# Patient Record
Sex: FEMALE | Race: WHITE | NOT HISPANIC OR LATINO | Employment: OTHER | URBAN - METROPOLITAN AREA
[De-identification: names, ages, dates, MRNs, and addresses within clinical notes are randomized per-mention and may not be internally consistent; named-entity substitution may affect disease eponyms.]

---

## 2017-01-01 ENCOUNTER — ALLSCRIPTS OFFICE VISIT (OUTPATIENT)
Dept: OTHER | Facility: OTHER | Age: 77
End: 2017-01-01

## 2018-01-01 ENCOUNTER — CLINICAL SUPPORT (OUTPATIENT)
Dept: CARDIOLOGY CLINIC | Facility: CLINIC | Age: 78
End: 2018-01-01
Payer: COMMERCIAL

## 2018-01-01 ENCOUNTER — HOSPITAL ENCOUNTER (EMERGENCY)
Facility: HOSPITAL | Age: 78
End: 2018-05-04
Attending: EMERGENCY MEDICINE
Payer: COMMERCIAL

## 2018-01-01 VITALS — HEART RATE: 52 BPM | RESPIRATION RATE: 16 BRPM

## 2018-01-01 DIAGNOSIS — I48.0 PAROXYSMAL ATRIAL FIBRILLATION (HCC): ICD-10-CM

## 2018-01-01 DIAGNOSIS — I49.9 ARRHYTHMIA: ICD-10-CM

## 2018-01-01 DIAGNOSIS — Z95.0 CARDIAC PACEMAKER IN SITU: ICD-10-CM

## 2018-01-01 DIAGNOSIS — I46.9 CARDIAC ARREST (HCC): Primary | ICD-10-CM

## 2018-01-01 DIAGNOSIS — I49.5 SICK SINUS SYNDROME (HCC): Primary | ICD-10-CM

## 2018-01-01 PROCEDURE — 93294 REM INTERROG EVL PM/LDLS PM: CPT | Performed by: INTERNAL MEDICINE

## 2018-01-01 PROCEDURE — 93296 REM INTERROG EVL PM/IDS: CPT | Performed by: INTERNAL MEDICINE

## 2018-01-01 PROCEDURE — 99285 EMERGENCY DEPT VISIT HI MDM: CPT

## 2018-02-13 NOTE — PROGRESS NOTES
LATITUDE TRANSMISSION: BATTERY VOLTAGE ADEQUATE (5 5 YRS)  -29%  ALL AVAILABLE LEAD PARAMETERS WITHIN NORMAL LIMITS  M[address] 48 RVR ON AVAILABLE EGM'S BUT CANNOT EXCLUDE NSVT  EF-55-60% (ECHO 12/13/13)  HX: PAF & ON PRADAXA & ATENOLOL  NORMAL DEVICE FUNCTION   GV

## 2018-03-07 NOTE — PROGRESS NOTES
"  Discussion/Summary  Normal device function      Results/Data  Cardiac Device Remote 27Oct2017 01:25PM [facility]     Test Name Result Flag Reference   MISCELLANEOUS COMMENT (Report)     LATITUDE TRANSMISSION: BATTERY VOLTAGE ADEQUATE (6 YRS)  -27%  ALL AVAILABLE LEAD PARAMETERS WITHIN NORMAL LIMITS  5,501 VHR EPISODES- RVR ON AVAILABLE EGM'S BUT CANNOT EXCLUDE NSVT  EF-55-60% (ECHO 12/13/13)  HX: PAF & ON PRADAXA & ATENOLOL  NORMAL DEVICE FUNCTION  GV   Cardiac Electrophysiology Report      [id number]  pdf   DEVICE TYPE Pacemaker       Cardiac Electrophysiology Report [id number] 01:25PM [facility]     Test Name Result Flag Reference   Cardiac Electrophysiology Report      [id number]  pdf     Signatures   Electronically signed by : Yasmin Mazariegos RN; Oct 27 2017  2:52PM EST                       (Author)    Electronically signed by : YULIA Cifuentes ; Oct 30 2017 12:04PM EST                       (Author)    "

## 2018-03-07 NOTE — PROGRESS NOTES
"  Discussion/Summary  Normal device function      Results/Data  Cardiac Device In Clinic 20Jun2017 01:49PM Jenni Craven     Test Name Result Flag Reference   MISCELLANEOUS COMMENT (Report)     DEVICE INTERROGATED IN THE [facility] OFFICE  [id number] VOLTAGE ADEQUATE (4 5 YRS)  AP=<1%, -57% (>40% DDDR @ 60 PPM)  ATRIAL UNDERSENSING NOTED ON PRESENTING EGRM  ALL OTHER LEAD PARAMETERS WITHIN NORMAL LIMITS & STABLE  49 6 k NSVT EPISODES SINCE LAST DEVICE REMOTE (10/2016)  RVR NOTED ON AVAILABLE EGM'S BUT CANNOT EXCLUDE NSVT  EF-55-60% (ECHO 12/13/13)  39 6 k ATR EPISODES- AF ON AVAILABLE EGM'S  HX: PAF & ON PRADAXA & ATENOLOL WITH AF BURDEN NOTED @ 96% - 351 DAYS  PACING MODE REPROGRAMMED TO VVIR 60  PACEMAKER FUNCTIONING APPROPRIATELY  eb   Cardiac Electrophysiology Report      [id number]  [facility] 6 20 17  pdf   DEVICE TYPE Pacemaker       Cardiac Electrophysiology Report [id number] 01:49PM Jenni Craven     Test Name Result Flag Reference   Cardiac Electrophysiology Report      [id number]  pdf     Signatures   Electronically signed by : Heidy Avalos, ; Jun 20 2017 10:16AM EST                       (Author)    Electronically signed by : YULIA Christopher ; Jun 20 2017  5:32PM EST                       (Author)    "

## 2018-03-07 NOTE — PROGRESS NOTES
"  Discussion/Summary  Normal device function      Results/Data  Results   Cardiac Device In Clinic [id number] 01:55PM Chepe Whitney     Test Name Result Flag Reference   MISCELLANEOUS COMMENT (Report)     DEVICE INTERROGATED IN THE [facility] OFFICE: BATTERY VOLTAGE ADEQUATE  AP 0%  53%  MULTIPLE VHRS NOTED, AVAIL EGRAMS APPEAR RVR VS NSVT  MULTIPLE AHRS NOTED, AVAIL EGRAMS APPEAR AFIB  AT/AF BURDEN 100%  PT  [address]  EF 55% (2013)  ALL LEAD PARAMETERS WITHIN NORMAL LIMITS  NORMAL DEVICE FUNCTION  NC   Cardiac Electrophysiology Report      [id number]  pdf   DEVICE TYPE Pacemaker       Cardiac Electrophysiology Report [id number] 01:55PM Chepe Whitney     Test Name Result Flag Reference   Cardiac Electrophysiology Report      [id number]  pdf     Signatures   Electronically signed by : Marge Ch, ; Jun 14 2016 11:27AM EST                       (Author)    Electronically signed by : YULIA Simms ; Jun 14 2016  3:39PM EST                       (Author)    "

## 2018-03-07 NOTE — PROGRESS NOTES
"  Discussion/Summary  Normal device function      Results/Data  Cardiac Device Remote 14Oct2016 07:17PM [facility]     Test Name Result Flag Reference   MISCELLANEOUS COMMENT (Report)     LATITUDE TRANSMISSION: BATTERY VOLTAGE ADEQUATE (5 YRS)  AP-0%, -53% (>40% DDDR @ 60 PPM)  2,745 NSVT EPISODES- RVR ON AVAILABLE EGM'S BUT CANNOT EXCLUDE NSVT  EF-55-60% (ECHO 12/13/13)  4,2080 ATR EPISODES- AF ON AVAILABLE EGM'S  HX: PAF & ON PRADAXA & ATENOLOL  AF BURDEN-96%  ALL AVAILABLE LEAD PARAMETERS WITHIN NORMAL LIMITS  NORMAL DEVICE FUNCTION  GV   Cardiac Electrophysiology Report      [id number]  pdf   DEVICE TYPE Pacemaker       Cardiac Electrophysiology Report [id number] 07:17PM [facility]     Test Name Result Flag Reference   Cardiac Electrophysiology Report      [id number]  pdf     Signatures   Electronically signed by : Kyle Siddiqi RN; Oct 14 2016  1:53PM EST                       (Author)    Electronically signed by : YULIA Moore ; Oct 14 2016  8:03PM EST                       (Author)    "

## 2018-03-07 NOTE — PROGRESS NOTES
"  Discussion/Summary  Normal device function      Results/Data  Results   Cardiac Device Remote 22Jan2016 04:14PM Christo Car     Test Name Result Flag Reference   MISCELLANEOUS COMMENT (Report)     LATITUDE TRANSMISSION: BATTERY VOLTAGE ADEQUATE (5 5 YRS)  AP-0%, -54% (>40% DDDR @ 60 BPM)  MULTIPLE VHR EPISODES-2 EGM'S AVAILABLE APPEAR TO BE RVR BUT CANNOT EXCLUDE ANY NSVT  MULTIPLE ATR EPISODES OF AF (95% OF TIME)  PT ON PRADAXA & ATENOLOL  EF-55-60% (ECHO 12/13/13)  ALL AVAILABLE LEAD PARAMETERS APPEAR WITHIN NORMAL LIMITS  NORMAL DEVICE FUNCTION  GV   Cardiac Electrophysiology Report      [id number]  pdf   DEVICE TYPE Pacemaker       Cardiac Electrophysiology Report [id number] 04:14PM Christo Car     Test Name Result Flag Reference   Cardiac Electrophysiology Report      [id number]  pdf     Signatures   Electronically signed by : Trisha Shukla RN; Jan 22 2016  2:53PM EST                       (Author)    Electronically signed by : Sita Hoyt DO; Feb 1 2016  6:38PM EST                       (Author)    "

## 2018-05-04 NOTE — ED NOTES
Patient at this time being prepped for family viewing at bedside  Awaiting additional family to ED and for further  home information       [address], RN  [phone]

## 2018-05-04 NOTE — ED NOTES
[facility] called at this time, nightshift teamleader made aware of residents death        Kishore Browning RN  05/04/18 2879